# Patient Record
Sex: FEMALE | Race: OTHER | HISPANIC OR LATINO
[De-identification: names, ages, dates, MRNs, and addresses within clinical notes are randomized per-mention and may not be internally consistent; named-entity substitution may affect disease eponyms.]

---

## 2017-04-17 ENCOUNTER — APPOINTMENT (OUTPATIENT)
Dept: GASTROENTEROLOGY | Facility: CLINIC | Age: 22
End: 2017-04-17

## 2017-04-17 VITALS
TEMPERATURE: 98.3 F | RESPIRATION RATE: 16 BRPM | DIASTOLIC BLOOD PRESSURE: 62 MMHG | SYSTOLIC BLOOD PRESSURE: 100 MMHG | HEIGHT: 61 IN | HEART RATE: 66 BPM | OXYGEN SATURATION: 100 % | WEIGHT: 100 LBS | BODY MASS INDEX: 18.88 KG/M2

## 2017-04-20 ENCOUNTER — OUTPATIENT (OUTPATIENT)
Dept: OUTPATIENT SERVICES | Facility: HOSPITAL | Age: 22
LOS: 1 days | Discharge: HOME | End: 2017-04-20
Payer: MEDICAID

## 2017-04-20 ENCOUNTER — APPOINTMENT (OUTPATIENT)
Dept: GASTROENTEROLOGY | Facility: CLINIC | Age: 22
End: 2017-04-20

## 2017-04-20 DIAGNOSIS — R15.9 FULL INCONTINENCE OF FECES: ICD-10-CM

## 2017-04-20 DIAGNOSIS — Z98.89 OTHER SPECIFIED POSTPROCEDURAL STATES: Chronic | ICD-10-CM

## 2017-04-20 PROCEDURE — 91122 ANORECTAL MANOMETRY: CPT | Mod: 26

## 2017-04-20 PROCEDURE — 91122: CPT

## 2017-04-25 ENCOUNTER — RESULT REVIEW (OUTPATIENT)
Age: 22
End: 2017-04-25

## 2017-04-25 ENCOUNTER — OTHER (OUTPATIENT)
Age: 22
End: 2017-04-25

## 2017-04-28 ENCOUNTER — APPOINTMENT (OUTPATIENT)
Dept: GASTROENTEROLOGY | Facility: HOSPITAL | Age: 22
End: 2017-04-28

## 2017-04-28 ENCOUNTER — OUTPATIENT (OUTPATIENT)
Dept: OUTPATIENT SERVICES | Facility: HOSPITAL | Age: 22
LOS: 1 days | End: 2017-04-28
Payer: MEDICAID

## 2017-04-28 DIAGNOSIS — R15.9 FULL INCONTINENCE OF FECES: ICD-10-CM

## 2017-04-28 DIAGNOSIS — Z98.89 OTHER SPECIFIED POSTPROCEDURAL STATES: Chronic | ICD-10-CM

## 2017-04-28 PROCEDURE — 45330 DIAGNOSTIC SIGMOIDOSCOPY: CPT

## 2017-04-28 PROCEDURE — 45341 SIGMOIDOSCOPY W/ULTRASOUND: CPT | Mod: GC

## 2017-05-02 ENCOUNTER — RESULT REVIEW (OUTPATIENT)
Age: 22
End: 2017-05-02

## 2017-05-09 ENCOUNTER — APPOINTMENT (OUTPATIENT)
Dept: COLORECTAL SURGERY | Facility: CLINIC | Age: 22
End: 2017-05-09

## 2017-05-09 VITALS
BODY MASS INDEX: 20.22 KG/M2 | HEART RATE: 73 BPM | SYSTOLIC BLOOD PRESSURE: 105 MMHG | DIASTOLIC BLOOD PRESSURE: 68 MMHG | WEIGHT: 103 LBS | OXYGEN SATURATION: 100 % | RESPIRATION RATE: 14 BRPM | HEIGHT: 60 IN

## 2017-05-09 RX ORDER — KETOCONAZOLE 20.5 MG/ML
2 SHAMPOO, SUSPENSION TOPICAL
Qty: 120 | Refills: 0 | Status: DISCONTINUED | COMMUNITY
Start: 2016-12-27

## 2017-05-09 RX ORDER — DICYCLOMINE HYDROCHLORIDE 20 MG/1
20 TABLET ORAL
Qty: 28 | Refills: 0 | Status: DISCONTINUED | COMMUNITY
Start: 2017-02-21

## 2017-05-09 RX ORDER — FLUOCINONIDE 0.5 MG/ML
0.05 SOLUTION TOPICAL
Qty: 50 | Refills: 0 | Status: DISCONTINUED | COMMUNITY
Start: 2016-12-27

## 2017-05-09 RX ORDER — MULTIVITAMIN WITH FOLIC ACID 400 MCG
TABLET ORAL
Qty: 30 | Refills: 0 | Status: DISCONTINUED | COMMUNITY
Start: 2016-12-02

## 2017-05-16 ENCOUNTER — APPOINTMENT (OUTPATIENT)
Dept: COLORECTAL SURGERY | Facility: CLINIC | Age: 22
End: 2017-05-16
Payer: MEDICAID

## 2017-05-16 PROCEDURE — 99213 OFFICE O/P EST LOW 20 MIN: CPT | Mod: 25

## 2017-05-16 PROCEDURE — 76872 US TRANSRECTAL: CPT

## 2017-05-16 PROCEDURE — 51785 ANAL/URINARY MUSCLE STUDY: CPT

## 2017-05-22 ENCOUNTER — OUTPATIENT (OUTPATIENT)
Dept: OUTPATIENT SERVICES | Facility: HOSPITAL | Age: 22
LOS: 1 days | End: 2017-05-22

## 2017-05-22 VITALS
DIASTOLIC BLOOD PRESSURE: 58 MMHG | SYSTOLIC BLOOD PRESSURE: 100 MMHG | HEART RATE: 70 BPM | RESPIRATION RATE: 16 BRPM | TEMPERATURE: 98 F | HEIGHT: 60.5 IN | WEIGHT: 100.97 LBS

## 2017-05-22 DIAGNOSIS — R15.9 FULL INCONTINENCE OF FECES: ICD-10-CM

## 2017-05-22 DIAGNOSIS — Z98.89 OTHER SPECIFIED POSTPROCEDURAL STATES: Chronic | ICD-10-CM

## 2017-05-22 DIAGNOSIS — R15.9 FULL INCONTINENCE OF FECES: Chronic | ICD-10-CM

## 2017-05-22 LAB
BLD GP AB SCN SERPL QL: NEGATIVE — SIGNIFICANT CHANGE UP
HCT VFR BLD CALC: 31.4 % — LOW (ref 34.5–45)
HGB BLD-MCNC: 9.3 G/DL — LOW (ref 11.5–15.5)
MCHC RBC-ENTMCNC: 24.5 PG — LOW (ref 27–34)
MCHC RBC-ENTMCNC: 29.6 % — LOW (ref 32–36)
MCV RBC AUTO: 82.6 FL — SIGNIFICANT CHANGE UP (ref 80–100)
PLATELET # BLD AUTO: 429 K/UL — HIGH (ref 150–400)
PMV BLD: 10.1 FL — SIGNIFICANT CHANGE UP (ref 7–13)
RBC # BLD: 3.8 M/UL — SIGNIFICANT CHANGE UP (ref 3.8–5.2)
RBC # FLD: 16.5 % — HIGH (ref 10.3–14.5)
RH IG SCN BLD-IMP: NEGATIVE — SIGNIFICANT CHANGE UP
WBC # BLD: 6.38 K/UL — SIGNIFICANT CHANGE UP (ref 3.8–10.5)
WBC # FLD AUTO: 6.38 K/UL — SIGNIFICANT CHANGE UP (ref 3.8–10.5)

## 2017-05-22 NOTE — H&P PST ADULT - HISTORY OF PRESENT ILLNESS
developed incontinence of stool after 2013 vaginal delivery pregnancy,  , repair of sphincter  ok for approx 1 year 20 y/o female with history of rectal incontinence presents to [facility] today for presurgical evaluation.  She developed rectal incontinence after vaginal delivery in .  She had a  in  and repair of rectal sphincter in .  She had improvement for apprioximately one year, but complains of bowel incontinence again.  She is scheduled for sphincteroplasty on 17.

## 2017-05-22 NOTE — H&P PST ADULT - RS GEN PE MLT RESP DETAILS PC
good air movement/breath sounds equal/no chest wall tenderness/respirations non-labored/clear to auscultation bilaterally

## 2017-05-22 NOTE — H&P PST ADULT - NSANTHOSAYNRD_GEN_A_CORE
No. ARNALDO screening performed.  STOP BANG Legend: 0-2 = LOW Risk; 3-4 = INTERMEDIATE Risk; 5-8 = HIGH Risk

## 2017-05-22 NOTE — H&P PST ADULT - PROBLEM SELECTOR PLAN 1
Pt. is scheduled for sphincteroplasty on 6/2/17.  Preoperative instructions reviewed, pt. verbalized understanding.  Preop Famotidine provided.  Lab results pending

## 2017-05-22 NOTE — H&P PST ADULT - PMH
Anemia    Incontinence of feces Anemia  hospitalized last year, almost required transfusion, given iron infusions  Incontinence of feces

## 2017-05-22 NOTE — H&P PST ADULT - ACTIVITY
walks daily, 1 flight stairs w/o SOB , housechores walks daily, able to climb 1 flight stairs, housechores

## 2017-05-22 NOTE — H&P PST ADULT - NEGATIVE NEUROLOGICAL SYMPTOMS
no difficulty walking/no paresthesias/no syncope/no focal seizures/no weakness/no generalized seizures

## 2017-05-22 NOTE — H&P PST ADULT - FAMILY HISTORY
Mother  Still living? Unknown  Family history of anemia, Age at diagnosis: Age Unknown     Sibling  Still living? Unknown  Family history of anemia, Age at diagnosis: Age Unknown No pertinent family history in first degree relatives

## 2017-06-01 NOTE — ASU PATIENT PROFILE, ADULT - PMH
Anemia  hospitalized last year, almost required transfusion, given iron infusions  Incontinence of feces

## 2017-06-02 ENCOUNTER — APPOINTMENT (OUTPATIENT)
Dept: COLORECTAL SURGERY | Facility: HOSPITAL | Age: 22
End: 2017-06-02

## 2017-06-02 ENCOUNTER — INPATIENT (INPATIENT)
Facility: HOSPITAL | Age: 22
LOS: 1 days | Discharge: HOME | End: 2017-06-04
Attending: STUDENT IN AN ORGANIZED HEALTH CARE EDUCATION/TRAINING PROGRAM | Admitting: SURGERY
Payer: MEDICAID

## 2017-06-02 ENCOUNTER — RESULT REVIEW (OUTPATIENT)
Age: 22
End: 2017-06-02

## 2017-06-02 VITALS
DIASTOLIC BLOOD PRESSURE: 56 MMHG | TEMPERATURE: 160 F | RESPIRATION RATE: 16 BRPM | SYSTOLIC BLOOD PRESSURE: 99 MMHG | WEIGHT: 100.97 LBS | OXYGEN SATURATION: 100 % | HEIGHT: 60.5 IN | HEART RATE: 71 BPM

## 2017-06-02 DIAGNOSIS — R15.9 FULL INCONTINENCE OF FECES: Chronic | ICD-10-CM

## 2017-06-02 DIAGNOSIS — R15.9 FULL INCONTINENCE OF FECES: ICD-10-CM

## 2017-06-02 DIAGNOSIS — Z98.89 OTHER SPECIFIED POSTPROCEDURAL STATES: Chronic | ICD-10-CM

## 2017-06-02 LAB
HCG UR QL: NEGATIVE — SIGNIFICANT CHANGE UP
RH IG SCN BLD-IMP: NEGATIVE — SIGNIFICANT CHANGE UP

## 2017-06-02 PROCEDURE — 88304 TISSUE EXAM BY PATHOLOGIST: CPT | Mod: 26

## 2017-06-02 RX ORDER — PSYLLIUM SEED (WITH DEXTROSE)
1 POWDER (GRAM) ORAL DAILY
Qty: 0 | Refills: 0 | Status: DISCONTINUED | OUTPATIENT
Start: 2017-06-02 | End: 2017-06-04

## 2017-06-02 RX ORDER — ONDANSETRON 8 MG/1
4 TABLET, FILM COATED ORAL EVERY 6 HOURS
Qty: 0 | Refills: 0 | Status: DISCONTINUED | OUTPATIENT
Start: 2017-06-02 | End: 2017-06-03

## 2017-06-02 RX ORDER — HYDROMORPHONE HYDROCHLORIDE 2 MG/ML
1 INJECTION INTRAMUSCULAR; INTRAVENOUS; SUBCUTANEOUS
Qty: 0 | Refills: 0 | Status: DISCONTINUED | OUTPATIENT
Start: 2017-06-02 | End: 2017-06-02

## 2017-06-02 RX ORDER — POLYETHYLENE GLYCOL 3350 17 G/17G
17 POWDER, FOR SOLUTION ORAL DAILY
Qty: 0 | Refills: 0 | Status: DISCONTINUED | OUTPATIENT
Start: 2017-06-02 | End: 2017-06-04

## 2017-06-02 RX ORDER — ONDANSETRON 8 MG/1
4 TABLET, FILM COATED ORAL ONCE
Qty: 0 | Refills: 0 | Status: COMPLETED | OUTPATIENT
Start: 2017-06-02 | End: 2017-06-02

## 2017-06-02 RX ORDER — OXYCODONE HYDROCHLORIDE 5 MG/1
5 TABLET ORAL EVERY 6 HOURS
Qty: 0 | Refills: 0 | Status: DISCONTINUED | OUTPATIENT
Start: 2017-06-02 | End: 2017-06-04

## 2017-06-02 RX ORDER — SODIUM CHLORIDE 9 MG/ML
1000 INJECTION, SOLUTION INTRAVENOUS
Qty: 0 | Refills: 0 | Status: DISCONTINUED | OUTPATIENT
Start: 2017-06-02 | End: 2017-06-02

## 2017-06-02 RX ORDER — HEPARIN SODIUM 5000 [USP'U]/ML
5000 INJECTION INTRAVENOUS; SUBCUTANEOUS EVERY 12 HOURS
Qty: 0 | Refills: 0 | Status: DISCONTINUED | OUTPATIENT
Start: 2017-06-02 | End: 2017-06-04

## 2017-06-02 RX ORDER — ACETAMINOPHEN 500 MG
650 TABLET ORAL EVERY 6 HOURS
Qty: 0 | Refills: 0 | Status: DISCONTINUED | OUTPATIENT
Start: 2017-06-02 | End: 2017-06-03

## 2017-06-02 RX ORDER — SODIUM CHLORIDE 9 MG/ML
1000 INJECTION, SOLUTION INTRAVENOUS
Qty: 0 | Refills: 0 | Status: DISCONTINUED | OUTPATIENT
Start: 2017-06-02 | End: 2017-06-03

## 2017-06-02 RX ADMIN — SODIUM CHLORIDE 75 MILLILITER(S): 9 INJECTION, SOLUTION INTRAVENOUS at 21:40

## 2017-06-02 RX ADMIN — HEPARIN SODIUM 5000 UNIT(S): 5000 INJECTION INTRAVENOUS; SUBCUTANEOUS at 22:41

## 2017-06-02 RX ADMIN — SODIUM CHLORIDE 75 MILLILITER(S): 9 INJECTION, SOLUTION INTRAVENOUS at 18:05

## 2017-06-02 RX ADMIN — Medication 200 MILLIGRAM(S): at 20:00

## 2017-06-02 RX ADMIN — ONDANSETRON 4 MILLIGRAM(S): 8 TABLET, FILM COATED ORAL at 17:35

## 2017-06-02 NOTE — BRIEF OPERATIVE NOTE - COMMENTS
tammy until POD#1; on discharge 2 weeks of daily Miralax and Metamucil, and pain control with oxycodone and staggered Tylenol and Motrin (so receives one per 3hr)

## 2017-06-02 NOTE — DISCHARGE NOTE ADULT - PLAN OF CARE
Please take metamucil and Miralax for two weeks following discharge.  Shower only, no baths. Return to daily living activity prior to surgery, postoperative recovery

## 2017-06-02 NOTE — DISCHARGE NOTE ADULT - MEDICATION SUMMARY - MEDICATIONS TO TAKE
I will START or STAY ON the medications listed below when I get home from the hospital:    acetaminophen 325 mg oral tablet  -- 2 tab(s) by mouth every 6 hours  -- Indication: For pain control    oxyCODONE 5 mg oral tablet  -- 1 tab(s) by mouth every 6 hours, As needed, Moderate Pain MDD:4  -- Indication: For pain control    divalproex sodium 500 mg oral delayed release tablet  -- 1 tab(s) by mouth 2 times a day  -- Indication: For home medication    Feosol 325 mg (65 mg elemental iron) oral tablet  -- 1 tab(s) by mouth once a day  -- Indication: For home medication    polyethylene glycol 3350 oral powder for reconstitution  -- 17 gram(s) by mouth once a day  -- Indication: For laxative    psyllium 3.4 g/7 g oral powder for reconstitution  --  by mouth   -- Indication: For laxative    ascorbic acid 500 mg oral tablet, chewable  -- 1 tab(s) by mouth once a day  -- Chew tablets before swallowing    -- Indication: For home medication    ergocalciferol  -- 29969 unit(s) by mouth once a week for 4 weeks  -- Indication: For home medication

## 2017-06-02 NOTE — BRIEF OPERATIVE NOTE - PROCEDURE
Sphincteroplasty, anal  06/02/2017  and levatorplasty  [facility]

## 2017-06-02 NOTE — BRIEF OPERATIVE NOTE - PRE-OP DX
Full incontinence of feces  06/02/2017  liquid and gas in continence with external sphicter defect  Active  Kailey Winter

## 2017-06-02 NOTE — DISCHARGE NOTE ADULT - PATIENT PORTAL LINK FT
“You can access the [employer] Patient Portal, offered by [facility], by registering with the following website: [email or web address]”

## 2017-06-02 NOTE — DISCHARGE NOTE ADULT - CARE PROVIDER_API CALL
Alex Roberson (MD), ColonRectal Surgery; Surgery  [facility] [address] [place] [address]  Phone: [phone]  Fax: [phone]

## 2017-06-02 NOTE — CHART NOTE - NSCHARTNOTEFT_GEN_A_CORE
B-Team Surgery Postoperative Check  S: Patient underwent sphincteroplasty without complication.  PACU course was uneventful.  Had episode of nausea in PACU but no vomiting. Currently denies nausea, vomiting, fevers, chills, dizziness, lightheadedness, or chest pain.  Pain well controlled.    O:   Vitals: T(C): 36.8, Max: 71 (06-02 @ 10:28)  HR: 64 (64 - 97)  BP: 109/62 (99/56 - 146/109)  RR: 17 (12 - 20)  SpO2: 98% (98% - 100%)  Wt(kg): --            I/O :    I & Os for current day (as of 06-02 @ 22:21)  =============================================  IN:    lactated ringers.: 225 ml    Oral Fluid: 50 ml    lactated ringers.: 30 ml    Total IN: 305 ml  ---------------------------------------------  OUT:    Indwelling Catheter - Urethral: 800 ml    Total OUT: 800 ml  ---------------------------------------------  Total NET: -495 ml    Gen: NAD  Abd: soft, nontender, nondistended  Back: Dressing in place, clean, dry and intact.    A/P 21 year old female s/p sphincteroplasty  Pain Control  CLD advance in AM  metamucil/miralax daily  OOB  IS    [place] [id number]

## 2017-06-02 NOTE — DISCHARGE NOTE ADULT - CARE PLAN
Instructions for follow-up, activity and diet:	Please take metamucil and Miralax for two weeks following discharge.  Shower only, no baths. Principal Discharge DX:	Incontinence of feces  Goal:	Return to daily living activity prior to surgery, postoperative recovery  Instructions for follow-up, activity and diet:	Please take metamucil and Miralax for two weeks following discharge.  Shower only, no baths.

## 2017-06-02 NOTE — DISCHARGE NOTE ADULT - HOSPITAL COURSE
22 y/o female with history of rectal incontinence presents to [facility] today for presurgical evaluation.  She developed rectal incontinence after vaginal delivery in .  She had a  in  and repair of rectal sphincter in .  She had improvement for apprioximately one year, but complains of bowel incontinence again.  She is scheduled for sphincteroplasty on 17.

## 2017-06-03 ENCOUNTER — TRANSCRIPTION ENCOUNTER (OUTPATIENT)
Age: 22
End: 2017-06-03

## 2017-06-03 LAB
BUN SERPL-MCNC: 5 MG/DL — LOW (ref 7–23)
CALCIUM SERPL-MCNC: 8.8 MG/DL — SIGNIFICANT CHANGE UP (ref 8.4–10.5)
CHLORIDE SERPL-SCNC: 105 MMOL/L — SIGNIFICANT CHANGE UP (ref 98–107)
CO2 SERPL-SCNC: 23 MMOL/L — SIGNIFICANT CHANGE UP (ref 22–31)
CREAT SERPL-MCNC: 0.67 MG/DL — SIGNIFICANT CHANGE UP (ref 0.5–1.3)
GLUCOSE SERPL-MCNC: 141 MG/DL — HIGH (ref 70–99)
HCT VFR BLD CALC: 28.2 % — LOW (ref 34.5–45)
HCT VFR BLD CALC: 29.7 % — LOW (ref 34.5–45)
HGB BLD-MCNC: 8.4 G/DL — LOW (ref 11.5–15.5)
HGB BLD-MCNC: 8.9 G/DL — LOW (ref 11.5–15.5)
MCHC RBC-ENTMCNC: 24 PG — LOW (ref 27–34)
MCHC RBC-ENTMCNC: 24 PG — LOW (ref 27–34)
MCHC RBC-ENTMCNC: 29.8 % — LOW (ref 32–36)
MCHC RBC-ENTMCNC: 30 % — LOW (ref 32–36)
MCV RBC AUTO: 80.1 FL — SIGNIFICANT CHANGE UP (ref 80–100)
MCV RBC AUTO: 80.6 FL — SIGNIFICANT CHANGE UP (ref 80–100)
PLATELET # BLD AUTO: 363 K/UL — SIGNIFICANT CHANGE UP (ref 150–400)
PLATELET # BLD AUTO: 389 K/UL — SIGNIFICANT CHANGE UP (ref 150–400)
PMV BLD: 9.7 FL — SIGNIFICANT CHANGE UP (ref 7–13)
PMV BLD: 9.9 FL — SIGNIFICANT CHANGE UP (ref 7–13)
POTASSIUM SERPL-MCNC: 4.1 MMOL/L — SIGNIFICANT CHANGE UP (ref 3.5–5.3)
POTASSIUM SERPL-SCNC: 4.1 MMOL/L — SIGNIFICANT CHANGE UP (ref 3.5–5.3)
RBC # BLD: 3.5 M/UL — LOW (ref 3.8–5.2)
RBC # BLD: 3.71 M/UL — LOW (ref 3.8–5.2)
RBC # FLD: 16.2 % — HIGH (ref 10.3–14.5)
RBC # FLD: 16.4 % — HIGH (ref 10.3–14.5)
SODIUM SERPL-SCNC: 140 MMOL/L — SIGNIFICANT CHANGE UP (ref 135–145)
WBC # BLD: 12.08 K/UL — HIGH (ref 3.8–10.5)
WBC # BLD: 12.12 K/UL — HIGH (ref 3.8–10.5)
WBC # FLD AUTO: 12.08 K/UL — HIGH (ref 3.8–10.5)
WBC # FLD AUTO: 12.12 K/UL — HIGH (ref 3.8–10.5)

## 2017-06-03 PROCEDURE — 93010 ELECTROCARDIOGRAM REPORT: CPT

## 2017-06-03 RX ORDER — SODIUM CHLORIDE 9 MG/ML
1000 INJECTION, SOLUTION INTRAVENOUS
Qty: 0 | Refills: 0 | Status: DISCONTINUED | OUTPATIENT
Start: 2017-06-03 | End: 2017-06-04

## 2017-06-03 RX ORDER — ACETAMINOPHEN 500 MG
650 TABLET ORAL EVERY 6 HOURS
Qty: 0 | Refills: 0 | Status: DISCONTINUED | OUTPATIENT
Start: 2017-06-03 | End: 2017-06-04

## 2017-06-03 RX ADMIN — OXYCODONE HYDROCHLORIDE 5 MILLIGRAM(S): 5 TABLET ORAL at 16:25

## 2017-06-03 RX ADMIN — Medication 1 PACKET(S): at 11:47

## 2017-06-03 RX ADMIN — OXYCODONE HYDROCHLORIDE 5 MILLIGRAM(S): 5 TABLET ORAL at 08:25

## 2017-06-03 RX ADMIN — OXYCODONE HYDROCHLORIDE 5 MILLIGRAM(S): 5 TABLET ORAL at 20:57

## 2017-06-03 RX ADMIN — OXYCODONE HYDROCHLORIDE 5 MILLIGRAM(S): 5 TABLET ORAL at 15:27

## 2017-06-03 RX ADMIN — POLYETHYLENE GLYCOL 3350 17 GRAM(S): 17 POWDER, FOR SOLUTION ORAL at 11:47

## 2017-06-03 RX ADMIN — HEPARIN SODIUM 5000 UNIT(S): 5000 INJECTION INTRAVENOUS; SUBCUTANEOUS at 11:47

## 2017-06-03 RX ADMIN — HEPARIN SODIUM 5000 UNIT(S): 5000 INJECTION INTRAVENOUS; SUBCUTANEOUS at 22:34

## 2017-06-03 RX ADMIN — OXYCODONE HYDROCHLORIDE 5 MILLIGRAM(S): 5 TABLET ORAL at 21:35

## 2017-06-03 RX ADMIN — OXYCODONE HYDROCHLORIDE 5 MILLIGRAM(S): 5 TABLET ORAL at 09:20

## 2017-06-03 NOTE — PROGRESS NOTE ADULT - SUBJECTIVE AND OBJECTIVE BOX
GENERAL SURGERY B TEAM PROGRESS NOTE  Subjective/Overnight  No acute events o/n.  patient with pain control  no gas or bowel movements    Objective    Vital Signs:   T(C): 36.6, Max: 71 (06-02 @ 10:28)  HR: 73 (63 - 97)  BP: 98/57 (93/54 - 146/109)  RR: 17 (12 - 20)  SpO2: 100% (98% - 100%)  Wt(kg): --    I&O  I & Os for 24h ending 06-03-17 @ 07:00  =============================================  IN:    lactated ringers.: 1050 ml    Oral Fluid: 50 ml    lactated ringers.: 30 ml    Total IN: 1130 ml  ---------------------------------------------  OUT:    Indwelling Catheter - Urethral: 1780 ml    Total OUT: 1780 ml  ---------------------------------------------  Total NET: -650 ml    I & Os for current day (as of 06-03-17 @ 09:15)  =============================================  IN:    lactated ringers.: 75 ml    Total IN: 75 ml  ---------------------------------------------  OUT:    Voided: 600 ml    Total OUT: 600 ml  ---------------------------------------------  Total NET: -525 ml      Physical Exam  General:NAD  Dressing C/DI, packing in place and sanguinous, drain in place

## 2017-06-03 NOTE — PROVIDER CONTACT NOTE (OTHER) - RECOMMENDATIONS
Give pain medication early. If you want patient to be on Tylenol an order needs to be placed,
This is the initial dressing. I can change it with your permission.

## 2017-06-03 NOTE — PROVIDER CONTACT NOTE (OTHER) - ACTION/TREATMENT ORDERED:
Patient is allowed to get Tylenol in-between oxy. Ok to give oxy now.
Dressing that is place is not an occlusive dressing, so I can leak. You can change the outer abdominal pad only.

## 2017-06-03 NOTE — PROGRESS NOTE ADULT - ASSESSMENT
21 year old female s/p sphincteroplasty 21 year old female s/p sphincteroplasty  - HRD  - patient started on and will go home with 2 weeks of daily miralax and metamucil  - DVT ppx  - Home tomorrow  -DTV

## 2017-06-03 NOTE — PROGRESS NOTE ADULT - ATTENDING COMMENTS
s/p anterior overlapping sphincteroplasty  Wound c/d/i, penrose in place    cont pain control, miralax/metamucil, OOB  d/c munoz

## 2017-06-04 VITALS
HEART RATE: 84 BPM | DIASTOLIC BLOOD PRESSURE: 51 MMHG | SYSTOLIC BLOOD PRESSURE: 104 MMHG | OXYGEN SATURATION: 95 % | RESPIRATION RATE: 20 BRPM | TEMPERATURE: 98 F

## 2017-06-04 LAB
BUN SERPL-MCNC: 4 MG/DL — LOW (ref 7–23)
CALCIUM SERPL-MCNC: 8.8 MG/DL — SIGNIFICANT CHANGE UP (ref 8.4–10.5)
CHLORIDE SERPL-SCNC: 104 MMOL/L — SIGNIFICANT CHANGE UP (ref 98–107)
CO2 SERPL-SCNC: 26 MMOL/L — SIGNIFICANT CHANGE UP (ref 22–31)
CREAT SERPL-MCNC: 0.69 MG/DL — SIGNIFICANT CHANGE UP (ref 0.5–1.3)
GLUCOSE SERPL-MCNC: 90 MG/DL — SIGNIFICANT CHANGE UP (ref 70–99)
HCT VFR BLD CALC: 27.8 % — LOW (ref 34.5–45)
HGB BLD-MCNC: 8.3 G/DL — LOW (ref 11.5–15.5)
MCHC RBC-ENTMCNC: 24.1 PG — LOW (ref 27–34)
MCHC RBC-ENTMCNC: 29.9 % — LOW (ref 32–36)
MCV RBC AUTO: 80.8 FL — SIGNIFICANT CHANGE UP (ref 80–100)
PLATELET # BLD AUTO: 397 K/UL — SIGNIFICANT CHANGE UP (ref 150–400)
PMV BLD: 9.7 FL — SIGNIFICANT CHANGE UP (ref 7–13)
POTASSIUM SERPL-MCNC: 3.6 MMOL/L — SIGNIFICANT CHANGE UP (ref 3.5–5.3)
POTASSIUM SERPL-SCNC: 3.6 MMOL/L — SIGNIFICANT CHANGE UP (ref 3.5–5.3)
RBC # BLD: 3.44 M/UL — LOW (ref 3.8–5.2)
RBC # FLD: 16.5 % — HIGH (ref 10.3–14.5)
SODIUM SERPL-SCNC: 143 MMOL/L — SIGNIFICANT CHANGE UP (ref 135–145)
WBC # BLD: 10.51 K/UL — HIGH (ref 3.8–10.5)
WBC # FLD AUTO: 10.51 K/UL — HIGH (ref 3.8–10.5)

## 2017-06-04 RX ORDER — PSYLLIUM SEED (WITH DEXTROSE)
0 POWDER (GRAM) ORAL
Qty: 0 | Refills: 0 | COMMUNITY
Start: 2017-06-04

## 2017-06-04 RX ORDER — OXYCODONE HYDROCHLORIDE 5 MG/1
1 TABLET ORAL
Qty: 20 | Refills: 0 | OUTPATIENT
Start: 2017-06-04 | End: 2017-06-09

## 2017-06-04 RX ORDER — ACETAMINOPHEN 500 MG
2 TABLET ORAL
Qty: 0 | Refills: 0 | COMMUNITY
Start: 2017-06-04

## 2017-06-04 RX ORDER — POLYETHYLENE GLYCOL 3350 17 G/17G
17 POWDER, FOR SOLUTION ORAL
Qty: 0 | Refills: 0 | COMMUNITY
Start: 2017-06-04

## 2017-06-04 RX ADMIN — Medication 1 PACKET(S): at 13:10

## 2017-06-04 RX ADMIN — SODIUM CHLORIDE 75 MILLILITER(S): 9 INJECTION, SOLUTION INTRAVENOUS at 01:40

## 2017-06-04 RX ADMIN — Medication 650 MILLIGRAM(S): at 01:45

## 2017-06-04 RX ADMIN — Medication 650 MILLIGRAM(S): at 05:25

## 2017-06-04 RX ADMIN — POLYETHYLENE GLYCOL 3350 17 GRAM(S): 17 POWDER, FOR SOLUTION ORAL at 13:10

## 2017-06-04 RX ADMIN — HEPARIN SODIUM 5000 UNIT(S): 5000 INJECTION INTRAVENOUS; SUBCUTANEOUS at 13:10

## 2017-06-04 RX ADMIN — Medication 650 MILLIGRAM(S): at 00:50

## 2017-06-04 RX ADMIN — Medication 650 MILLIGRAM(S): at 13:10

## 2017-06-04 NOTE — PROGRESS NOTE ADULT - ASSESSMENT
21 year old female s/p sphincteroplasty  - HRD  - patient started on and will go home with 2 weeks of daily miralax and metamucil  - DVT ppx  -DTV 21 year old female s/p sphincteroplasty  - HRD  - patient started on and will go home with 2 weeks of daily miralax and metamucil  - DVT ppx  -DTV  - home today

## 2017-06-04 NOTE — PROGRESS NOTE ADULT - SUBJECTIVE AND OBJECTIVE BOX
GENERAL SURGERY DAILY PROGRESS NOTE:       Subjective: denies n/v/f/c, denies SOB or CP         Objective:  General: NAD  HEENT: WNL  Lymph nodes: Non-tender, no palpable masses  Neck: No masses  Cardiovascular: Regular rate and rhythm; normal S1, S2; no murmurs, rubs, or gallops  Lungs: Lungs clear to auscultation bilaterally; No wheezes or crackles    Abdominal:  -Abdomen soft and non-distended  -No pulsatile masses, no abdominal bruits ascultated  -Non-Tender; No reflex tenderness; No guarding;     MEDICATIONS  (STANDING):  heparin  Injectable 5000Unit(s) SubCutaneous every 12 hours  polyethylene glycol 3350 17Gram(s) Oral daily  psyllium Powder 1Packet(s) Oral daily  acetaminophen   Tablet. 650milliGRAM(s) Oral every 6 hours    MEDICATIONS  (PRN):  oxyCODONE IR 5milliGRAM(s) Oral every 6 hours PRN Moderate Pain      Vital Signs Last 24 Hrs  T(C): 36.9, Max: 98.2 (06-04 @ 01:14)  T(F): 98.5, Max: 208.8 (06-04 @ 01:14)  HR: 75 (64 - 87)  BP: 104/59 (94/50 - 111/68)  BP(mean): --  RR: 17 (14 - 18)  SpO2: 100% (96% - 100%)    I&O's Detail    I & Os for current day (as of 04 Jun 2017 08:49)  =============================================  IN:    lactated ringers.: 450 ml    lactated ringers.: 75 ml    Total IN: 525 ml  ---------------------------------------------  OUT:    Voided: 1800 ml    Indwelling Catheter - Urethral: 400 ml    Total OUT: 2200 ml  ---------------------------------------------  Total NET: -0880 ml      Daily     Daily     LABS:                        8.3    10.51 )-----------( 397      ( 04 Jun 2017 05:00 )             27.8     06-04    143  |  104  |  4<L>  ----------------------------<  90  3.6   |  26  |  0.69    Ca    8.8      04 Jun 2017 05:00

## 2017-06-06 ENCOUNTER — APPOINTMENT (OUTPATIENT)
Dept: COLORECTAL SURGERY | Facility: CLINIC | Age: 22
End: 2017-06-06

## 2017-06-06 VITALS
HEART RATE: 87 BPM | TEMPERATURE: 97.8 F | RESPIRATION RATE: 14 BRPM | DIASTOLIC BLOOD PRESSURE: 73 MMHG | SYSTOLIC BLOOD PRESSURE: 109 MMHG | OXYGEN SATURATION: 98 %

## 2017-06-06 RX ORDER — NEOMYCIN SULFATE 500 MG/1
500 TABLET ORAL
Qty: 6 | Refills: 0 | Status: DISCONTINUED | COMMUNITY
Start: 2017-06-01 | End: 2017-06-06

## 2017-06-06 RX ORDER — METRONIDAZOLE 500 MG/1
500 TABLET ORAL
Qty: 3 | Refills: 0 | Status: DISCONTINUED | COMMUNITY
Start: 2017-06-01 | End: 2017-06-06

## 2017-06-06 RX ORDER — OXYCODONE 5 MG/1
5 TABLET ORAL
Refills: 0 | Status: ACTIVE | COMMUNITY

## 2017-06-08 LAB — SURGICAL PATHOLOGY STUDY: SIGNIFICANT CHANGE UP

## 2017-06-13 ENCOUNTER — APPOINTMENT (OUTPATIENT)
Dept: COLORECTAL SURGERY | Facility: CLINIC | Age: 22
End: 2017-06-13

## 2017-06-15 ENCOUNTER — APPOINTMENT (OUTPATIENT)
Dept: COLORECTAL SURGERY | Facility: CLINIC | Age: 22
End: 2017-06-15

## 2017-07-06 ENCOUNTER — APPOINTMENT (OUTPATIENT)
Dept: COLORECTAL SURGERY | Facility: CLINIC | Age: 22
End: 2017-07-06

## 2017-07-06 DIAGNOSIS — R15.9 FULL INCONTINENCE OF FECES: ICD-10-CM

## 2017-09-05 ENCOUNTER — APPOINTMENT (OUTPATIENT)
Dept: COLORECTAL SURGERY | Facility: CLINIC | Age: 22
End: 2017-09-05

## 2018-01-28 ENCOUNTER — EMERGENCY (EMERGENCY)
Facility: HOSPITAL | Age: 23
LOS: 1 days | Discharge: HOME | End: 2018-01-28
Attending: EMERGENCY MEDICINE
Payer: MEDICAID

## 2018-01-28 VITALS
DIASTOLIC BLOOD PRESSURE: 60 MMHG | HEART RATE: 76 BPM | RESPIRATION RATE: 19 BRPM | OXYGEN SATURATION: 98 % | WEIGHT: 98.99 LBS | SYSTOLIC BLOOD PRESSURE: 119 MMHG | TEMPERATURE: 98 F | HEIGHT: 60 IN

## 2018-01-28 DIAGNOSIS — R15.9 FULL INCONTINENCE OF FECES: Chronic | ICD-10-CM

## 2018-01-28 DIAGNOSIS — Z98.89 OTHER SPECIFIED POSTPROCEDURAL STATES: Chronic | ICD-10-CM

## 2018-01-28 LAB
ALBUMIN SERPL ELPH-MCNC: 4 G/DL — SIGNIFICANT CHANGE UP (ref 3.5–5)
ALP SERPL-CCNC: 35 U/L — LOW (ref 40–120)
ALT FLD-CCNC: 16 U/L DA — SIGNIFICANT CHANGE UP (ref 10–60)
ANION GAP SERPL CALC-SCNC: 6 MMOL/L — SIGNIFICANT CHANGE UP (ref 5–17)
AST SERPL-CCNC: 9 U/L — LOW (ref 10–40)
BILIRUB SERPL-MCNC: 0.2 MG/DL — SIGNIFICANT CHANGE UP (ref 0.2–1.2)
BUN SERPL-MCNC: 10 MG/DL — SIGNIFICANT CHANGE UP (ref 7–18)
CALCIUM SERPL-MCNC: 8.7 MG/DL — SIGNIFICANT CHANGE UP (ref 8.4–10.5)
CHLORIDE SERPL-SCNC: 104 MMOL/L — SIGNIFICANT CHANGE UP (ref 96–108)
CO2 SERPL-SCNC: 28 MMOL/L — SIGNIFICANT CHANGE UP (ref 22–31)
CREAT SERPL-MCNC: 0.58 MG/DL — SIGNIFICANT CHANGE UP (ref 0.5–1.3)
GLUCOSE SERPL-MCNC: 74 MG/DL — SIGNIFICANT CHANGE UP (ref 70–99)
HCG UR QL: POSITIVE
HCT VFR BLD CALC: 27.8 % — LOW (ref 34.5–45)
HGB BLD-MCNC: 8.1 G/DL — LOW (ref 11.5–15.5)
LIDOCAIN IGE QN: 164 U/L — SIGNIFICANT CHANGE UP (ref 73–393)
MCHC RBC-ENTMCNC: 21 PG — LOW (ref 27–34)
MCHC RBC-ENTMCNC: 29.1 GM/DL — LOW (ref 32–36)
MCV RBC AUTO: 72.1 FL — LOW (ref 80–100)
PLATELET # BLD AUTO: 393 K/UL — SIGNIFICANT CHANGE UP (ref 150–400)
POTASSIUM SERPL-MCNC: 3.4 MMOL/L — LOW (ref 3.5–5.3)
POTASSIUM SERPL-SCNC: 3.4 MMOL/L — LOW (ref 3.5–5.3)
PROT SERPL-MCNC: 7.8 G/DL — SIGNIFICANT CHANGE UP (ref 6–8.3)
RBC # BLD: 3.86 M/UL — SIGNIFICANT CHANGE UP (ref 3.8–5.2)
RBC # FLD: 18.3 % — HIGH (ref 10.3–14.5)
SODIUM SERPL-SCNC: 138 MMOL/L — SIGNIFICANT CHANGE UP (ref 135–145)
WBC # BLD: 7.5 K/UL — SIGNIFICANT CHANGE UP (ref 3.8–10.5)
WBC # FLD AUTO: 7.5 K/UL — SIGNIFICANT CHANGE UP (ref 3.8–10.5)

## 2018-01-28 PROCEDURE — 76830 TRANSVAGINAL US NON-OB: CPT | Mod: 26

## 2018-01-28 PROCEDURE — 99285 EMERGENCY DEPT VISIT HI MDM: CPT | Mod: 25

## 2018-01-28 PROCEDURE — 76856 US EXAM PELVIC COMPLETE: CPT | Mod: 26,59

## 2018-01-28 RX ORDER — KETOROLAC TROMETHAMINE 30 MG/ML
15 SYRINGE (ML) INJECTION ONCE
Qty: 0 | Refills: 0 | Status: DISCONTINUED | OUTPATIENT
Start: 2018-01-28 | End: 2018-01-28

## 2018-01-28 NOTE — ED PROVIDER NOTE - OBJECTIVE STATEMENT
22 year old female with 3 days lower abd pain.  sent from urgent care. also with vaginal bleeding.  iud in place.  found to be pregnant in ed.  no n/v.  hx of anemia. not currently taking iron

## 2018-01-28 NOTE — ED PROVIDER NOTE - PROGRESS NOTE DETAILS
seen by gyn, iud left in place. no bleeding.  will dc wioth followup in office tomorrow. return precautions given

## 2018-01-28 NOTE — ED ADULT NURSE NOTE - OBJECTIVE STATEMENT
Patient complain of vaginal bleeding with clots for 9 days, history of anemia, no transfusion, only iron transfusion. IUD in place

## 2018-01-28 NOTE — ED ADULT TRIAGE NOTE - CHIEF COMPLAINT QUOTE
abdominal pain x 3 days.have menstrual period for 9 days,,lightheaded.headache,sent from urgent care

## 2018-01-29 DIAGNOSIS — Z34.90 ENCOUNTER FOR SUPERVISION OF NORMAL PREGNANCY, UNSPECIFIED, UNSPECIFIED TRIMESTER: ICD-10-CM

## 2018-01-29 DIAGNOSIS — D64.9 ANEMIA, UNSPECIFIED: ICD-10-CM

## 2018-01-29 PROCEDURE — 76856 US EXAM PELVIC COMPLETE: CPT

## 2018-01-29 PROCEDURE — 83690 ASSAY OF LIPASE: CPT

## 2018-01-29 PROCEDURE — 81025 URINE PREGNANCY TEST: CPT

## 2018-01-29 PROCEDURE — 76830 TRANSVAGINAL US NON-OB: CPT

## 2018-01-29 PROCEDURE — 85027 COMPLETE CBC AUTOMATED: CPT

## 2018-01-29 PROCEDURE — 80053 COMPREHEN METABOLIC PANEL: CPT

## 2018-01-29 PROCEDURE — 99284 EMERGENCY DEPT VISIT MOD MDM: CPT | Mod: 25

## 2018-01-29 PROCEDURE — 84702 CHORIONIC GONADOTROPIN TEST: CPT

## 2018-01-29 RX ORDER — ACETAMINOPHEN 500 MG
650 TABLET ORAL ONCE
Qty: 0 | Refills: 0 | Status: COMPLETED | OUTPATIENT
Start: 2018-01-29 | End: 2018-01-29

## 2018-01-29 RX ORDER — FERROUS SULFATE 325(65) MG
1 TABLET ORAL
Qty: 30 | Refills: 0 | OUTPATIENT
Start: 2018-01-29

## 2018-01-29 RX ORDER — POLYETHYLENE GLYCOL 3350 17 G/17G
17 POWDER, FOR SOLUTION ORAL
Qty: 300 | Refills: 0 | OUTPATIENT
Start: 2018-01-29 | End: 2018-02-17

## 2018-01-29 RX ORDER — ACETAMINOPHEN 500 MG
1 TABLET ORAL
Qty: 25 | Refills: 0 | OUTPATIENT
Start: 2018-01-29

## 2018-01-29 RX ADMIN — Medication 650 MILLIGRAM(S): at 00:59

## 2018-01-29 NOTE — CONSULT NOTE ADULT - ASSESSMENT
a/p: 23 y/o  LMP  [facility] with IUD early pregnancy vs incomplete , stable  start iron tid for anemia and colace prn for constipation  f/u in the office tomorrow, copy of results given to the patient  return to the ER if symptoms worsen  d/w Dr. Perkins

## 2018-01-29 NOTE — CONSULT NOTE ADULT - SUBJECTIVE AND OBJECTIVE BOX
21 y/o  LMP  presented with c/o vaginal bleeding since her LMP, increasing in quantity with clots associated with pelvic pain described as cramping on and off. Patient uses about 5-6 pads a day, saturated with some clots. Patient has IUD in place for 1 1/2 years. Patient denies syncope, CP, SOB, n.v, palpitations, diaphoresis, fever. GYN doctor Josue last visit 2017. Patient is undecided in regards to keeping the pregnancy. Patient does not want IUD removed at this time.  pobx  x2 ,  boy FT  pgynx denies  pmhx chronic anemia  psx ,  reconstructive surgery of rectum s/p trauma of vaginal delivery in   meds none   allergies denies    Vital Signs Last 24 Hrs  T(C): 36.7 (2018 20:47), Max: 36.7 (2018 20:47)  T(F): 98 (2018 20:47), Max: 98 (2018 20:47)  HR: 76 (2018 20:47) (76 - 76)  BP: 119/60 (2018 20:47) (119/60 - 119/60)  BP(mean): --  RR: 19 (2018 20:47) (19 - 19)  SpO2: 98% (2018 20:47) (98% - 98%)    gen aox3, not in acute distress, non toxic appearing  abd soft, non tender, no guarding, no rebound  speculum os appears closed, IUD string visualized, minimal blood evacuated from the vault with sponge stick, no active bleeding  bimanual no cmt, no adnexa tenderness b/l                          8.1    7.5   )-----------( 393      ( 2018 22:21 )             27.8       HCG Quantitative, Serum (18 @ 22:21)    HCG Quantitative, Serum: 878: GROUP        REFERENCE RANGE  non-pregnant females   ages 18-62          1-3 mIU/mL   adult males      ages 19-67                        <1   mIU/mL  hCG Levels with Gestational Age  0.2-1 week   5-50 mIU/mL  1-2 weeks    mIU/mL  2-3 weeks   100-5,000 mIU/mL  4-5 weeks   1,000-50,000 mIU/mL  5-6 weeks   10,000-100,000 mIU/mL  6-8 weeks   15,000-200,000 mIU/mL  2-3 months   10,000-100,000 mIU/mL  The above table provides only a very rough estimate of gestational age  and should be used only in conjunction with other methods for  establishing gestational age. mIU/mL        < from: US Pelvis Complete (18 @ 22:43) >  FINDINGS:   The uterus is normal in size measuring 9.0 x 5.2 x 4.4 cm. No   intrauterine masses are present. The endometrial thickness measures 4 mm   and is within normal limits. An IUD is seen normally placed within the   endometrium.    The right ovary measures 3.3 x 2.4 x 3.3 cm and contains a simple   appearing 2.3 x 1.8 x 2.3 cm cyst. The left ovary measures 2.4 x 1.9 x   2.1 cm. There are no adnexal masses. Normal arterial and venous waveforms   are seen within the ovaries.    There is no free pelvic fluid.    IMPRESSION:   IUD in place.   2.3 cm right ovarian cyst.    < end of copied text >

## 2018-01-31 ENCOUNTER — TRANSCRIPTION ENCOUNTER (OUTPATIENT)
Age: 23
End: 2018-01-31

## 2018-07-08 NOTE — CONSULT NOTE ADULT - PROBLEM SELECTOR RECOMMENDATION 9
a/p: 23 y/o  LMP  [facility] 878 with IUD early pregnancy vs incomplete , stable  start iron tid for anemia and colace prn for constipation  f/u in the office tomorrow, copy of results given to the patient  return to the ER if symptoms worsen  d/w Dr. Perkins Private car

## 2018-11-26 NOTE — ED ADULT NURSE NOTE - MODE OF DISCHARGE
[facility]    [address] [address]    Phone:  [phone]                                       Carolyn Mallory   MRN: [id number]    Department:  [facility]   Date of Visit:  11/26/2018           After Visit Summary Signature Page     I have received my discharge instructions, and my questions have been answered. I have discussed any challenges I see with this plan with the nurse or doctor.    ..........................................................................................................................................  Patient/Patient Representative Signature      ..........................................................................................................................................  Patient Representative Print Name and Relationship to Patient    ..................................................               ................................................  Date                                   Time    ..........................................................................................................................................  Reviewed by Signature/Title    ...................................................              ..............................................  Date                                               Time          22EPIC Rev 08/18         Ambulatory

## 2024-11-26 NOTE — H&P PST ADULT - GASTROINTESTINAL DETAILS
C/o congestion    Congestion/drainage, sore throat, sinus pressure, fevers 101, body aches    Sx started Sunday     Tyl and ibu today          Patient Education: no distention/no masses palpable/soft/nontender/bowel sounds normal

## 2025-07-09 ENCOUNTER — APPOINTMENT (OUTPATIENT)
Dept: COLORECTAL SURGERY | Facility: CLINIC | Age: 30
End: 2025-07-09